# Patient Record
Sex: FEMALE | Race: WHITE | NOT HISPANIC OR LATINO | Employment: UNEMPLOYED | URBAN - METROPOLITAN AREA
[De-identification: names, ages, dates, MRNs, and addresses within clinical notes are randomized per-mention and may not be internally consistent; named-entity substitution may affect disease eponyms.]

---

## 2024-12-08 ENCOUNTER — OFFICE VISIT (OUTPATIENT)
Dept: URGENT CARE | Facility: CLINIC | Age: 24
End: 2024-12-08
Payer: COMMERCIAL

## 2024-12-08 VITALS
SYSTOLIC BLOOD PRESSURE: 115 MMHG | HEIGHT: 60 IN | BODY MASS INDEX: 37.3 KG/M2 | WEIGHT: 190 LBS | OXYGEN SATURATION: 98 % | TEMPERATURE: 97.8 F | HEART RATE: 85 BPM | DIASTOLIC BLOOD PRESSURE: 79 MMHG

## 2024-12-08 DIAGNOSIS — H02.052 TRICHIASIS OF RIGHT LOWER EYELID: ICD-10-CM

## 2024-12-08 DIAGNOSIS — H10.31 ACUTE BACTERIAL CONJUNCTIVITIS OF RIGHT EYE: Primary | ICD-10-CM

## 2024-12-08 PROBLEM — L91.8 SKIN TAGS, MULTIPLE ACQUIRED: Status: ACTIVE | Noted: 2024-12-08

## 2024-12-08 PROBLEM — B00.1 HERPES SIMPLEX LABIALIS: Status: ACTIVE | Noted: 2024-12-08

## 2024-12-08 PROBLEM — R11.2 NAUSEA WITH VOMITING: Status: RESOLVED | Noted: 2024-12-08 | Resolved: 2024-12-08

## 2024-12-08 PROBLEM — J30.9 ALLERGIC RHINITIS: Status: ACTIVE | Noted: 2024-12-08

## 2024-12-08 PROBLEM — J98.8 WHEEZING-ASSOCIATED RESPIRATORY INFECTION (WARI): Status: RESOLVED | Noted: 2024-12-08 | Resolved: 2024-12-08

## 2024-12-08 PROBLEM — R10.9 ABDOMINAL PAIN: Status: RESOLVED | Noted: 2024-12-08 | Resolved: 2024-12-08

## 2024-12-08 PROBLEM — F90.9 ADHD (ATTENTION DEFICIT HYPERACTIVITY DISORDER): Status: ACTIVE | Noted: 2024-12-08

## 2024-12-08 PROBLEM — F32.A DEPRESSION: Status: ACTIVE | Noted: 2024-12-08

## 2024-12-08 PROBLEM — J45.909 ASTHMA: Status: ACTIVE | Noted: 2024-12-08

## 2024-12-08 PROBLEM — L30.9 ECZEMA: Status: ACTIVE | Noted: 2024-12-08

## 2024-12-08 PROCEDURE — 99213 OFFICE O/P EST LOW 20 MIN: CPT

## 2024-12-08 PROCEDURE — 3008F BODY MASS INDEX DOCD: CPT

## 2024-12-08 RX ORDER — ERYTHROMYCIN 5 MG/G
OINTMENT OPHTHALMIC 3 TIMES DAILY
Qty: 3.5 G | Refills: 0 | Status: SHIPPED | OUTPATIENT
Start: 2024-12-08 | End: 2024-12-15

## 2024-12-08 RX ORDER — CIPROFLOXACIN HYDROCHLORIDE 3 MG/ML
1 SOLUTION/ DROPS OPHTHALMIC
Qty: 5 ML | Refills: 0 | Status: SHIPPED | OUTPATIENT
Start: 2024-12-08 | End: 2024-12-13

## 2024-12-08 ASSESSMENT — ENCOUNTER SYMPTOMS
CONSTITUTIONAL NEGATIVE: 1
RESPIRATORY NEGATIVE: 1
FEVER: 0
PERI-ORBITAL EDEMA: 1
EYE REDNESS: 1
NAUSEA: 0
COLOR CHANGE: 0
BLURRED VISION: 0
PHOTOPHOBIA: 0
EYE PAIN: 1
RHINORRHEA: 0
FACIAL SWELLING: 0
HEADACHES: 0
VOICE CHANGE: 0
CRUSTING: 1
DIAPHORESIS: 0
GASTROINTESTINAL NEGATIVE: 1
CARDIOVASCULAR NEGATIVE: 1
MYALGIAS: 0
DOUBLE VISION: 0
EYE ITCHING: 0
CHILLS: 0
PALPITATIONS: 0
EYE WATERING: 1
WOUND: 0
DIZZINESS: 0
ARTHRALGIAS: 0
WEAKNESS: 0
SORE THROAT: 0
MUSCULOSKELETAL NEGATIVE: 1
SHORTNESS OF BREATH: 0
COUGH: 0
NEUROLOGICAL NEGATIVE: 1
FATIGUE: 0
ABDOMINAL PAIN: 0
EYE DISCHARGE: 1
TROUBLE SWALLOWING: 0
VOMITING: 0
DIARRHEA: 0

## 2024-12-08 ASSESSMENT — VISUAL ACUITY: OU: 1

## 2024-12-08 NOTE — PATIENT INSTRUCTIONS
CONJUNCTIVITIS       - ERYTHROMYCIN eye ointment has been prescribed to be placed on the lower eyelid for 5-7 days for antibiotic coverage    - CIPROFLOXACIN 0.3% eye drops have been prescribed to be used every 2 hours while awake for 2 days, and then every 4 hours for the next 5 days      - OLOPATADINE eye drops may be used for allergic conjunctivitis (itchiness and irritation), this may also be purchased over the counter    - Advised to avoid contact with others until 24 hours after prescription started     - If seasonal allergies were a contributing factor to eye irritation, take antihistamine (Zyrtec/Xyzal/Claritin/Allegra) 1 pill once a day, preferably at night as may cause mild drowsiness    - Avoid rubbing the eyes. If itching is bothersome, use artificial tears, a cool compress, or antihistamine eye drops     - If you wear contact lenses, be sure to dispose of current lenses and start fresh after treatment, wear glasses while treating eye infection    - If you have been wearing eye makeup, be sure to dispose of any recently used products as they are contaminated    - Wash hands frequently and avoid sharing towels, bedsheets, or other personal products with others     - Follow-up with eye doctor ASAP if symptoms don't improve or get worse in the next 3 days, if you do not have an eye doctor you can try:         [address] [address] [phone] or          [facility] [address] [place] [address] [phone]

## 2024-12-08 NOTE — PROGRESS NOTES
"Subjective   History  Demi Travis is a 24 y.o. female who presents for Eye Problem.    Patient reports right eye redness, pain, and discharge for the last 2-3 days. She reports an area where her eyelash grows inwards due to childhood dog bite, but not eyelid tenderness or visible stye.      History provided by:  Patient and medical records   used: No    Eye Problem  Location:  Right eye  Quality:  Tearing  Severity:  Mild  Onset quality:  Sudden  Duration:  3 days  Timing:  Constant  Context: not chemical exposure, not contact lens problem, not direct trauma and not foreign body    Relieved by:  Eye drops  Associated symptoms: crusting, discharge, redness, swelling and tearing    Associated symptoms: no blurred vision, no double vision, no headaches, no itching, no nausea, no photophobia, no vomiting and no weakness      No past surgical history on file.       Review of Systems   Review of Systems   Constitutional: Negative.  Negative for chills, diaphoresis, fatigue and fever.   HENT: Negative.  Negative for congestion, facial swelling, rhinorrhea, sore throat, trouble swallowing and voice change.    Eyes:  Positive for pain, discharge and redness. Negative for blurred vision, double vision, photophobia, itching and visual disturbance.   Respiratory: Negative.  Negative for cough and shortness of breath.    Cardiovascular: Negative.  Negative for chest pain and palpitations.   Gastrointestinal: Negative.  Negative for abdominal pain, diarrhea, nausea and vomiting.   Musculoskeletal: Negative.  Negative for arthralgias and myalgias.   Skin: Negative.  Negative for color change, rash and wound.   Neurological: Negative.  Negative for dizziness, weakness and headaches.       Objective   Vital Signs  /79 (BP Location: Right arm, Patient Position: Sitting, BP Cuff Size: Small adult)   Pulse 85   Temp 36.6 °C (97.8 °F) (Oral)   Ht 1.511 m (4' 11.5\")   Wt 86.2 kg (190 lb)   LMP 12/02/2024 " (Approximate)   SpO2 98%   BMI 37.73 kg/m²    All vitals have been reviewed and are stable.     Physical Exam  Physical Exam  Vitals and nursing note reviewed.   Constitutional:       General: She is awake. She is not in acute distress.     Appearance: Normal appearance. She is well-developed. She is not ill-appearing, toxic-appearing or diaphoretic.   HENT:      Head: Normocephalic and atraumatic. No right periorbital erythema or left periorbital erythema.      Jaw: There is normal jaw occlusion.      Right Ear: External ear normal.      Left Ear: External ear normal.      Nose: Nose normal. No congestion or rhinorrhea.      Mouth/Throat:      Lips: Pink. No lesions.      Mouth: Mucous membranes are moist. No oral lesions or angioedema.      Pharynx: Oropharynx is clear.   Eyes:      General: Lids are normal. Lids are everted, no foreign bodies appreciated. Vision grossly intact. Gaze aligned appropriately.         Right eye: Discharge present. No foreign body or hordeolum.         Left eye: No discharge or hordeolum.      Extraocular Movements: Extraocular movements intact.      Right eye: Normal extraocular motion.      Left eye: Normal extraocular motion.      Conjunctiva/sclera:      Right eye: Right conjunctiva is injected. No chemosis or hemorrhage.     Left eye: Left conjunctiva is not injected. No chemosis.     Pupils: Pupils are equal, round, and reactive to light.   Cardiovascular:      Rate and Rhythm: Normal rate and regular rhythm.   Pulmonary:      Effort: Pulmonary effort is normal. No tachypnea or respiratory distress.      Breath sounds: Normal air entry. No stridor. No wheezing.   Abdominal:      General: Abdomen is flat. There is no distension.      Palpations: Abdomen is soft.   Musculoskeletal:         General: No swelling or deformity. Normal range of motion.      Cervical back: Full passive range of motion without pain, normal range of motion and neck supple.   Skin:     General: Skin is  warm and dry.      Findings: No erythema or rash.   Neurological:      General: No focal deficit present.      Mental Status: She is alert and oriented to person, place, and time. Mental status is at baseline.      Motor: Motor function is intact.      Coordination: Coordination is intact.   Psychiatric:         Mood and Affect: Mood and affect normal.         Speech: Speech normal.         Behavior: Behavior normal. Behavior is cooperative.         Thought Content: Thought content normal.         Judgment: Judgment normal.         Diagnostic Results   No results found for this or any previous visit (from the past 48 hours).    Assessment/Plan   Procedures   N/A    Problem List Items Addressed This Visit       Trichiasis of right lower eyelid    Relevant Medications    erythromycin (Romycin) 5 mg/gram (0.5 %) ophthalmic ointment    ciprofloxacin (Ciloxan) 0.3 % ophthalmic solution     Other Visit Diagnoses       Acute bacterial conjunctivitis of right eye    -  Primary    Relevant Medications    erythromycin (Romycin) 5 mg/gram (0.5 %) ophthalmic ointment    ciprofloxacin (Ciloxan) 0.3 % ophthalmic solution            UC Course  Patient disposition: Home    Red flags for reporting to ER have been reviewed with the patient.    Current diagnosis, any medication changes, and all in-office lab or radiologic results have been reviewed with the patient at the time of the visit.   If symptoms do not improve or worsen, patient is to follow up with PCP or report to the emergency room.   Patient is alert and oriented x3 and non-toxic appearing. Vital signs are stable.   Patient and/or guardian has sufficient decision-making capabilities at this time and reports understanding and agreement with the treatment plan made through shared decision-making.